# Patient Record
(demographics unavailable — no encounter records)

---

## 2024-10-16 NOTE — ASSESSMENT
[FreeTextEntry1] : 62F p/w josefa knee contusions  Begin PT - kindly request authorization for physical therapy to improve strength and ROM  Continue meloxicam for pain as needed - rx renewed today return 1 month, consider MRI if no improvement.

## 2024-10-16 NOTE — WORK
[Other: ___] : [unfilled] [Was the competent medical cause of the injury] : was the competent medical cause of the injury [Are consistent with the injury] : are consistent with the injury [Consistent with my objective findings] : consistent with my objective findings [Mild Partial] : mild partial [Does not reveal pre-existing condition(s) that may affect treatment/prognosis] : does not reveal pre-existing condition(s) that may affect treatment/prognosis [Can return to work without limitations on ______] : can return to work without limitations on [unfilled] [No Rx restrictions] : No Rx restrictions. [I provided the services listed above] :  I provided the services listed above.

## 2024-10-16 NOTE — PHYSICAL EXAM
[Bilateral] : knee bilaterally [NL (140)] : flexion 140 degrees [NL (0)] : extension 0 degrees [5___] : hamstring 5[unfilled]/5 [Negative] : negative Shilpi's [] : light touch is intact throughout [FreeTextEntry9] : No fractures, dislocations, or joint effusions.  Preserved joint spaces with smooth and intact articular surfaces. No joint margin erosions or intra-articular or periarticular calcifications.

## 2024-10-16 NOTE — HISTORY OF PRESENT ILLNESS
[7] : 7 [6] : 6 [Shooting] : shooting [] : yes [Constant] : constant [Household chores] : household chores [Social interactions] : social interactions [Standing] : standing [Full time] : Work status: full time [de-identified] : * WC DOI 08 10 2024*  9/4/24: pt is here today for bilateral knee and right hip pain, pain started after trying to complete a dressing on a pt, not knowing there was a cord on the floor, tripping over the cord. States hip is improving. Taking meloxicam for the pain, with relief.   10/16/2024: pt is here today for f/u on B/L knees, R hip. pt states no changes since last visit, still has pain within the areas. [FreeTextEntry1] : B/L KNEE, RT HIP  [FreeTextEntry7] : for rt knee, pain radiates from knee into rt hip. 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

## 2024-11-13 NOTE — HISTORY OF PRESENT ILLNESS
[7] : 7 [6] : 6 [Shooting] : shooting [] : yes [Constant] : constant [Household chores] : household chores [Social interactions] : social interactions [Standing] : standing [Full time] : Work status: full time [de-identified] : * WC DOI 08 10 2024*  9/4/24: pt is here today for bilateral knee and right hip pain, pain started after trying to complete a dressing on a pt, not knowing there was a cord on the floor, tripping over the cord. States hip is improving. Taking meloxicam for the pain, with relief.   10/16/2024: pt is here today for f/u on B/L knees, R hip. pt states no changes since last visit, still has pain within the areas.  11/13/24: pt is here today for f/u on b/l knees, no changes since last visit, still having pain within the knees. still completing PT 2xwk [FreeTextEntry1] : B/L KNEE, RT HIP  [FreeTextEntry7] : for rt knee, pain radiates from knee into rt hip. 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

## 2024-11-13 NOTE — HISTORY OF PRESENT ILLNESS
[7] : 7 [6] : 6 [Shooting] : shooting [] : yes [Constant] : constant [Household chores] : household chores [Social interactions] : social interactions [Standing] : standing [Full time] : Work status: full time [de-identified] : * WC DOI 08 10 2024*  9/4/24: pt is here today for bilateral knee and right hip pain, pain started after trying to complete a dressing on a pt, not knowing there was a cord on the floor, tripping over the cord. States hip is improving. Taking meloxicam for the pain, with relief.   10/16/2024: pt is here today for f/u on B/L knees, R hip. pt states no changes since last visit, still has pain within the areas.  11/13/24: pt is here today for f/u on b/l knees, no changes since last visit, still having pain within the knees. still completing PT 2xwk [FreeTextEntry1] : B/L KNEE, RT HIP  [FreeTextEntry7] : for rt knee, pain radiates from knee into rt hip. 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

## 2024-11-13 NOTE — PHYSICAL EXAM
[NL (140)] : flexion 140 degrees [NL (0)] : extension 0 degrees [5___] : hamstring 5[unfilled]/5 [Negative] : negative Shilpi's [Bilateral] : knee bilaterally [] : negative Lachmann [FreeTextEntry9] : No fractures, dislocations, or joint effusions.  Preserved joint spaces with smooth and intact articular surfaces. No joint margin erosions or intra-articular or periarticular calcifications.

## 2024-11-13 NOTE — ASSESSMENT
[FreeTextEntry1] : 62F p/w josefa knee contusions  Continue PT f/u MRI josefa knees return after imaging   The patient was advised of the diagnosis. The natural history of the pathology was explained in full to the patient in layman's terms. All questions were answered. The risks and benefits of surgical and non-surgical treatment alternatives were explained in full to the patient.

## 2025-01-15 NOTE — ASSESSMENT
[FreeTextEntry1] : 62F p/w josefa knee contusions  diclofenac for pain Continue PT  MRI josefa knees reviewed, OA, bony edema on the left return 6 weeks   The patient was advised of the diagnosis. The natural history of the pathology was explained in full to the patient in layman's terms. All questions were answered. The risks and benefits of surgical and non-surgical treatment alternatives were explained in full to the patient.

## 2025-01-15 NOTE — HISTORY OF PRESENT ILLNESS
[7] : 7 [6] : 6 [Shooting] : shooting [] : yes [Constant] : constant [Household chores] : household chores [Social interactions] : social interactions [Standing] : standing [Full time] : Work status: full time [de-identified] : * WC DOI 08 10 2024*  9/4/24: pt is here today for bilateral knee and right hip pain, pain started after trying to complete a dressing on a pt, not knowing there was a cord on the floor, tripping over the cord. States hip is improving. Taking meloxicam for the pain, with relief.   10/16/2024: pt is here today for f/u on B/L knees, R hip. pt states no changes since last visit, still has pain within the areas.  11/13/24: pt is here today for f/u on b/l knees, no changes since last visit, still having pain within the knees. still completing PT 2xwk  1/15/25- Patient is here to review MRI [FreeTextEntry1] : B/L KNEE, RT HIP  [FreeTextEntry7] : for rt knee, pain radiates from knee into rt hip. 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

## 2025-03-05 NOTE — PHYSICAL EXAM
[NL (140)] : flexion 140 degrees [NL (0)] : extension 0 degrees [5___] : hamstring 5[unfilled]/5 [Negative] : negative Shilpi's [] : light touch is intact throughout [Bilateral] : knee bilaterally

## 2025-03-05 NOTE — WORK
[Other: ___] : [unfilled] [Was the competent medical cause of the injury] : was the competent medical cause of the injury [Consistent with my objective findings] : consistent with my objective findings [Are consistent with the injury] : are consistent with the injury [Mild Partial] : mild partial [Does not reveal pre-existing condition(s) that may affect treatment/prognosis] : does not reveal pre-existing condition(s) that may affect treatment/prognosis [Can return to work without limitations on ______] : can return to work without limitations on [unfilled] [No Rx restrictions] : No Rx restrictions. [I provided the services listed above] :  I provided the services listed above.

## 2025-03-05 NOTE — HISTORY OF PRESENT ILLNESS
[7] : 7 [6] : 6 [Shooting] : shooting [] : yes [Constant] : constant [Household chores] : household chores [Social interactions] : social interactions [Standing] : standing [Full time] : Work status: full time

## 2025-04-30 NOTE — WORK
none [Mild Partial] : mild partial [Does not reveal pre-existing condition(s) that may affect treatment/prognosis] : does not reveal pre-existing condition(s) that may affect treatment/prognosis [Can return to work without limitations on ______] : can return to work without limitations on [unfilled] [No Rx restrictions] : No Rx restrictions. [I provided the services listed above] :  I provided the services listed above.

## 2025-06-19 NOTE — HISTORY OF PRESENT ILLNESS
[de-identified] : * WC DOI 08 10 2024*  9/4/24: pt is here today for bilateral knee and right hip pain, pain started after trying to complete a dressing on a pt, not knowing there was a cord on the floor, tripping over the cord. States hip is improving. Taking meloxicam for the pain, with relief.   10/16/2024: pt is here today for f/u on B/L knees, R hip. pt states no changes since last visit, still has pain within the areas.  11/13/24: pt is here today for f/u on b/l knees, no changes since last visit, still having pain within the knees. still completing PT 2xwk  1/15/25- Patient is here to review MRI  03/05/2025: Patient is here for f/u on b/l knees, reports she still having some pain within the knee.  4/30/25: Patient is here today for f/u on B/L knee, reports she still has some pain within the knee. has been completing physical therapy 2-3xwk. She is having stiffness still  6/18/25: f/u bilateral knees. mild improvement with PT

## 2025-06-19 NOTE — ASSESSMENT
[FreeTextEntry1] : 63F p/w josefa knee contusions  continue diclofenac for pain Continue PT return 8 weeks   The patient was advised of the diagnosis. The natural history of the pathology was explained in full to the patient in layman's terms. All questions were answered. The risks and benefits of surgical and non-surgical treatment alternatives were explained in full to the patient.